# Patient Record
Sex: MALE | Race: BLACK OR AFRICAN AMERICAN | NOT HISPANIC OR LATINO | URBAN - NONMETROPOLITAN AREA
[De-identification: names, ages, dates, MRNs, and addresses within clinical notes are randomized per-mention and may not be internally consistent; named-entity substitution may affect disease eponyms.]

---

## 2018-07-02 PROBLEM — Z96.1: Noted: 2018-07-02

## 2018-07-02 PROBLEM — H40.1133: Noted: 2018-07-02

## 2019-03-27 ENCOUNTER — IMPORTED ENCOUNTER (OUTPATIENT)
Dept: URBAN - NONMETROPOLITAN AREA CLINIC 1 | Facility: CLINIC | Age: 69
End: 2019-03-27

## 2019-03-27 PROCEDURE — 92083 EXTENDED VISUAL FIELD XM: CPT

## 2019-03-27 PROCEDURE — 92133 CPTRZD OPH DX IMG PST SGM ON: CPT

## 2019-03-27 NOTE — PATIENT DISCUSSION
"s/p PCIOL with trab OD 1/18/17.- S/p trab OS- [id number] [address].- Patient doing well. - IOP 9:8- Continue wearing sunglasses when outside.- Continue to monitor. -VF 10/2 small key hole remaining OD Pt using Pred Acetate OU PRNPCIOL OU stable in good position. Early PCO-Explained symptoms of advancing PCO. -Continue to monitor for now. Pt will notify us if any new symptoms develop. ""COAG-Appears stable. Ordered OCT ONH and VF at next visit-RTC 4 Mo IOP check VF OCT ONH; 's Notes: MAC OCT- 11/15/17VF- 24-2- 11/7/17VF- 10-2- 7/2/18ONH OCT - 7/17/17"

## 2019-04-03 ENCOUNTER — IMPORTED ENCOUNTER (OUTPATIENT)
Dept: URBAN - NONMETROPOLITAN AREA CLINIC 1 | Facility: CLINIC | Age: 69
End: 2019-04-03

## 2019-04-03 PROCEDURE — 92014 COMPRE OPH EXAM EST PT 1/>: CPT

## 2019-04-03 NOTE — PATIENT DISCUSSION
"COAG OU s/p PCIOL with trab OD 1/18/17.- S/p trab OS- [id number] [address] Patient doing well. - IOP 10 OU - Continue wearing sunglasses when outside.- Continue to monitor. -VF 10/2 small key hole remaining OD -HVF performed on 3/27/19 and reviewed with patient. Severe constriction OD -OCT ONH performed and reviewed with the patient.adv. cupping and loss of RNFL OU -ORder Fundus photos next visit Pt to use Pred Acetate OU QD or every other day. Escribed refills. PCIOL OU stable in good position. Early PCO-Explained symptoms of advancing PCO. -Continue to monitor for now.  Pt will notify us if any new symptoms develop.""; 's Notes: MAC OCT- 11/15/17VF- 24-2- 11/7/17VF- 10-2- 7/2/18ONH OCT - 7/17/17"

## 2019-10-01 ENCOUNTER — IMPORTED ENCOUNTER (OUTPATIENT)
Dept: URBAN - NONMETROPOLITAN AREA CLINIC 1 | Facility: CLINIC | Age: 69
End: 2019-10-01

## 2019-10-01 PROCEDURE — 92014 COMPRE OPH EXAM EST PT 1/>: CPT

## 2019-10-01 NOTE — PATIENT DISCUSSION
"COAG OU s/p PCIOL with trab OD 1/18/17.- S/p trab OU- Patient doing well. - IOP 10 OU - Continue wearing sunglasses when outside.- Continue to monitor. -VF 10/2 at next visit -HVF performed on 3/27/19  Severe constriction OD no field OS available -OCT ONH next visit .-Patient is advised to call immediately if he experiences redness pain and/or decreased vision -Okay to D/C prednisone drops as continuing prednisone may increase risk of bleb thinning  -Patient is to begin AT's PCIOL OU stable in good position. Progressing  PCO-Explained symptoms of advancing PCO. -Continue to monitor for now.  Pt will notify us if any new symptoms develop.""; 's Notes: MAC OCT- 11/15/17VF- 24-2- 11/7/17VF- 10-2- 7/2/18ON OCT - 7/17/17"

## 2020-01-27 ENCOUNTER — IMPORTED ENCOUNTER (OUTPATIENT)
Dept: URBAN - NONMETROPOLITAN AREA CLINIC 1 | Facility: CLINIC | Age: 70
End: 2020-01-27

## 2020-01-27 PROCEDURE — 92083 EXTENDED VISUAL FIELD XM: CPT

## 2020-01-27 PROCEDURE — 92133 CPTRZD OPH DX IMG PST SGM ON: CPT

## 2020-01-27 PROCEDURE — 99213 OFFICE O/P EST LOW 20 MIN: CPT

## 2020-01-27 NOTE — PATIENT DISCUSSION
COAG OU -  discussed findings w/patient-  s/p PCIOL with trab OD 1/18/17.-  S/p trab OU-  IOPs stable at 10 11 -  10-2 VF OD: severe constriction stable findings.  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

## 2022-04-09 ASSESSMENT — VISUAL ACUITY
OD_PH: 20/40
OD_CC: 20/40-1
OD_CC: 20/50
OD_SC: 20/80

## 2022-04-09 ASSESSMENT — TONOMETRY
OD_IOP_MMHG: 10
OS_IOP_MMHG: 11
OD_IOP_MMHG: 10
OS_IOP_MMHG: 10
OS_IOP_MMHG: 10
OD_IOP_MMHG: 10